# Patient Record
Sex: FEMALE | Race: WHITE | NOT HISPANIC OR LATINO | Employment: OTHER | URBAN - METROPOLITAN AREA
[De-identification: names, ages, dates, MRNs, and addresses within clinical notes are randomized per-mention and may not be internally consistent; named-entity substitution may affect disease eponyms.]

---

## 2018-11-07 PROBLEM — R60.0 LOWER EXTREMITY EDEMA: Status: ACTIVE | Noted: 2018-11-07

## 2018-11-07 PROBLEM — R32 URINARY INCONTINENCE: Status: ACTIVE | Noted: 2018-11-07

## 2018-11-07 PROBLEM — I10 HYPERTENSION: Status: ACTIVE | Noted: 2018-11-07

## 2018-11-07 PROBLEM — E78.5 HYPERLIPIDEMIA: Status: ACTIVE | Noted: 2018-11-07

## 2018-11-07 RX ORDER — LANOLIN ALCOHOL/MO/W.PET/CERES
1 CREAM (GRAM) TOPICAL DAILY
Refills: 0
Start: 2018-11-07 | End: 2018-12-17 | Stop reason: SDUPTHER

## 2018-11-07 RX ORDER — CITALOPRAM 20 MG/1
20 TABLET ORAL DAILY
Refills: 0
Start: 2018-11-07 | End: 2018-12-17 | Stop reason: SDUPTHER

## 2018-11-07 RX ORDER — FUROSEMIDE 40 MG/1
40 TABLET ORAL DAILY
Refills: 0
Start: 2018-11-07 | End: 2018-12-17 | Stop reason: SDUPTHER

## 2018-11-07 RX ORDER — AMLODIPINE BESYLATE 10 MG/1
10 TABLET ORAL DAILY
Refills: 0
Start: 2018-11-07 | End: 2018-12-17 | Stop reason: SDUPTHER

## 2018-11-07 RX ORDER — TRAZODONE HYDROCHLORIDE 50 MG/1
50 TABLET ORAL
Refills: 0
Start: 2018-11-07 | End: 2018-12-17 | Stop reason: SDUPTHER

## 2018-11-07 RX ORDER — MULTIVITAMIN
1 CAPSULE ORAL DAILY
Refills: 0
Start: 2018-11-07 | End: 2018-12-17 | Stop reason: SDUPTHER

## 2018-11-07 RX ORDER — ASPIRIN 81 MG/1
81 TABLET, CHEWABLE ORAL DAILY
Refills: 0
Start: 2018-11-07 | End: 2018-12-17 | Stop reason: SDUPTHER

## 2018-11-08 ENCOUNTER — OFFICE VISIT (OUTPATIENT)
Dept: FAMILY MEDICINE CLINIC | Facility: CLINIC | Age: 83
End: 2018-11-08
Payer: MEDICARE

## 2018-11-08 VITALS
BODY MASS INDEX: 29.37 KG/M2 | WEIGHT: 159.6 LBS | HEIGHT: 62 IN | SYSTOLIC BLOOD PRESSURE: 150 MMHG | RESPIRATION RATE: 16 BRPM | HEART RATE: 82 BPM | TEMPERATURE: 98.1 F | DIASTOLIC BLOOD PRESSURE: 80 MMHG

## 2018-11-08 DIAGNOSIS — I25.2 HISTORY OF MI (MYOCARDIAL INFARCTION): ICD-10-CM

## 2018-11-08 DIAGNOSIS — E78.5 HYPERLIPIDEMIA, UNSPECIFIED HYPERLIPIDEMIA TYPE: Chronic | ICD-10-CM

## 2018-11-08 DIAGNOSIS — Z76.0 MEDICATION REFILL: ICD-10-CM

## 2018-11-08 DIAGNOSIS — F33.1 MODERATE EPISODE OF RECURRENT MAJOR DEPRESSIVE DISORDER (HCC): Chronic | ICD-10-CM

## 2018-11-08 DIAGNOSIS — I10 ESSENTIAL HYPERTENSION: Chronic | ICD-10-CM

## 2018-11-08 DIAGNOSIS — R26.2 AMBULATORY DYSFUNCTION: Primary | ICD-10-CM

## 2018-11-08 DIAGNOSIS — Z95.820 S/P ANGIOPLASTY WITH STENT: ICD-10-CM

## 2018-11-08 PROBLEM — F41.9 ANXIETY: Status: ACTIVE | Noted: 2018-11-08

## 2018-11-08 PROBLEM — F32.A DEPRESSION: Status: ACTIVE | Noted: 2018-11-08

## 2018-11-08 PROBLEM — H35.30 MACULAR DEGENERATION: Status: ACTIVE | Noted: 2018-11-08

## 2018-11-08 PROBLEM — Q60.3 HYPOPLASIA OF ONE KIDNEY: Status: ACTIVE | Noted: 2018-11-08

## 2018-11-08 PROBLEM — H91.90 HEARING LOSS: Status: ACTIVE | Noted: 2018-11-08

## 2018-11-08 PROBLEM — I65.29 CAROTID STENOSIS: Status: ACTIVE | Noted: 2018-11-08

## 2018-11-08 PROBLEM — N39.0 CHRONIC UTI: Status: ACTIVE | Noted: 2018-11-08

## 2018-11-08 PROCEDURE — 99213 OFFICE O/P EST LOW 20 MIN: CPT | Performed by: FAMILY MEDICINE

## 2018-11-08 RX ORDER — IPRATROPIUM BROMIDE 42 UG/1
2 SPRAY, METERED NASAL 4 TIMES DAILY
COMMUNITY
End: 2018-12-27 | Stop reason: SDUPTHER

## 2018-11-08 RX ORDER — DIAPER,BRIEF,ADULT, DISPOSABLE
EACH MISCELLANEOUS
COMMUNITY

## 2018-11-08 RX ORDER — ALENDRONATE SODIUM 70 MG/1
70 TABLET ORAL
COMMUNITY
End: 2018-12-27 | Stop reason: SDUPTHER

## 2018-11-08 RX ORDER — CLOPIDOGREL BISULFATE 75 MG/1
75 TABLET ORAL DAILY
COMMUNITY
End: 2018-12-27 | Stop reason: SDUPTHER

## 2018-11-08 RX ORDER — ISOSORBIDE MONONITRATE 60 MG/1
60 TABLET, EXTENDED RELEASE ORAL DAILY
COMMUNITY

## 2018-11-08 RX ORDER — LOSARTAN POTASSIUM 50 MG/1
25 TABLET ORAL DAILY
COMMUNITY
End: 2018-12-27 | Stop reason: SDUPTHER

## 2018-11-08 RX ORDER — ATORVASTATIN CALCIUM 40 MG/1
40 TABLET, FILM COATED ORAL DAILY
COMMUNITY

## 2018-11-08 RX ORDER — CEPHALEXIN 250 MG/1
500 CAPSULE ORAL 4 TIMES DAILY
COMMUNITY
End: 2018-12-27 | Stop reason: SDUPTHER

## 2018-11-08 RX ORDER — MONTELUKAST SODIUM 10 MG/1
10 TABLET ORAL
COMMUNITY
End: 2018-12-13 | Stop reason: ALTCHOICE

## 2018-11-08 NOTE — PATIENT INSTRUCTIONS

## 2018-11-08 NOTE — Clinical Note
Patient just recently moved to the area and is older adult  Do you know of any senior care communities or resources for her to get involved in the community and make friends? Thank you!

## 2018-11-08 NOTE — PROGRESS NOTES
Thien Díaz 1934 female MRN: [id number]    Visit to Establish Care: Family Medicine    SUBJECTIVE    HPI:  Thien Díaz is a 80 y o  female who presented to establish care with this family medicine practice  Immunizations: Got flu this year, got shingles, TDAP months ago  Got 1 PNA vaccine  Recurrent falls  REcently 2 days ago in tub  Usually with legs giving out  Slips  Never prodromal symptoms  Want shower seat  Dizziness when stands too quickly  SOB with exertion (vacuming)  Not at rest  No chest pain  No urinary incontinence since sling  Keflex not covered by [employer], asking for formulary exception  Hasn't had a UTI over a year  She easily gets bruised on Plavix  No recent illness  Want to reduce medications  Wants cardiology referral  Recently had echo and carotid ultrasounds  Just moved from [place]  Living with son and daughter in law  Hx working in [place] [id number]  Fosamax is new  She just had bloodwork done by Dr Aleta Dance  PHQ-9 Depression Screening    PHQ-9:    Frequency of the following problems over the past two weeks:       Little interest or pleasure in doing things:  1 - several days  Feeling down, depressed, or hopeless:  0 - not at all  Trouble falling or staying asleep, or sleeping too much:  0 - not at all  Feeling tired or having little energy:  3 - nearly every day  Poor appetite or overeatin - not at all  Feeling bad about yourself - or that you are a failure or have let yourself or your family down:  0 - not at all  Trouble concentrating on things, such as reading the newspaper or watching television:  0 - not at all  Moving or speaking so slowly that other people could have noticed   Or the opposite - being so fidgety or restless that you have been moving around a lot more than usual:  0 - not at all  Thoughts that you would be better off dead, or of hurting yourself in some way:  0 - not at all  PHQ-2 Score:  1  PHQ-9 Score: 4       FLAVIO-7 Flowsheet Screening      Most Recent Value   Over the last two weeks, how often have you been bothered by the following problems? Feeling nervous, anxious, or on edge  3   Not being able to stop or control worrying  3   Worrying too much about different things  3   Trouble relaxing   0   Being so restless that it's hard to sit still  0   Becoming easily annoyed or irritable   0   Feeling afraid as if something awful might happen  0   FLAVIO Score   9          Health Maintenance   Topic Date Due    DTaP,Tdap,and Td Vaccines (1 - Tdap) 12/16/1955    Pneumococcal PPSV23/PCV13 65+ Years / Low and Medium Risk (1 of 2 - PCV13) 12/16/1999    INFLUENZA VACCINE  07/01/2018    Fall Risk  11/08/2019    Urinary Incontinence Screening  11/08/2019     CRC screening: No personal or family history of colon cancer or colon polyps  BrCa screening: There is no personal or family history of breast cancer  She denies finding new breast lumps, breast pain or nipple discharge  CVS screening: Patient denies any exertional chest pain, dyspnea, palpitations, syncope, orthopnea, edema or paroxysmal nocturnal dyspnea  DM screening: No polyuria, polydipsia, blurry vision, chest pain, dyspnea or claudication  No foot burning, numbness or pain  No personal or family history of skin cancers or melanoma  Review of Systems   Constitutional: Negative for activity change, chills and fever  HENT: Negative for congestion, rhinorrhea and sore throat  Eyes: Negative for visual disturbance  Respiratory: Negative for cough, shortness of breath and wheezing  Cardiovascular: Negative for chest pain and palpitations  Gastrointestinal: Negative for abdominal pain, blood in stool, constipation, diarrhea, nausea and vomiting  Genitourinary: Negative for dysuria  Musculoskeletal: Negative for arthralgias and myalgias  Skin: Negative for rash  Neurological: Positive for weakness (falls, usually mechanical)   Negative for dizziness, tremors, syncope, light-headedness and headaches  Psychiatric/Behavioral: Positive for dysphoric mood and sleep disturbance  Negative for self-injury and suicidal ideas  The patient is nervous/anxious  All other systems reviewed and are negative        Historical Information   Past Medical History:   Diagnosis Date    Hearing loss     History of MI (myocardial infarction) 11/9/2018    Hyperlipidemia     Hypertension     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    Myocardial infarction ([address] )     S/P angioplasty with stent 11/9/2018    S/p nephrectomy 11/9/2018    Urinary incontinence 11/7/2018     Past Surgical History:   Procedure Laterality Date    CARDIAC CATHETERIZATION      CAROTID STENT Left 2016    CATARACT EXTRACTION, BILATERAL  2010    CORONARY ANGIOPLASTY WITH STENT PLACEMENT  2006    INCONTINENCE SURGERY  2010    NEPHRECTOMY Left 2003    TONSILLECTOMY  1938    TUBAL LIGATION  1983     Family History   Problem Relation Age of Onset    Heart disease Mother     Diabetes type II Mother     Heart disease Father     Heart attack Father     Stroke Paternal Grandmother     Cancer Sister     Brain cancer Brother     Aneurysm Other      Social History   History   Alcohol Use No     History   Drug Use No     History   Smoking Status    Former Smoker    Packs/day: 1 00    Years: 40 00    Types: Cigarettes   Smokeless Tobacco    Never Used     Comment: quit 1991       Medications:      Current Outpatient Prescriptions:     alendronate (FOSAMAX) 70 mg tablet, Take 70 mg by mouth every 7 days, Disp: , Rfl:     amLODIPine (NORVASC) 10 mg tablet, Take 1 tablet (10 mg total) by mouth daily (Patient taking differently: Take 5 mg by mouth daily  ), Disp: , Rfl: 0    aspirin 81 mg chewable tablet, Chew 1 tablet (81 mg total) daily, Disp: , Rfl: 0    atorvastatin (LIPITOR) 40 mg tablet, Take 40 mg by mouth daily, Disp: , Rfl:     Calcium Carbonate-Vitamin D (CALTRATE 600+D) 600-400 MG-UNIT per tablet, Take 1 tablet by mouth daily, Disp: , Rfl: 0    cephalexin (KEFLEX) 250 mg capsule, Take 500 mg by mouth 4 (four) times a day, Disp: , Rfl:     citalopram (CeleXA) 20 mg tablet, Take 1 tablet (20 mg total) by mouth daily, Disp: , Rfl: 0    clopidogrel (PLAVIX) 75 mg tablet, Take 75 mg by mouth daily, Disp: , Rfl:     furosemide (LASIX) 40 mg tablet, Take 1 tablet (40 mg total) by mouth daily, Disp: , Rfl: 0    glucosamine-chondroitin 500-400 MG tablet, Take 1 tablet by mouth daily, Disp: , Rfl: 0    Incontinence Supply Disposable (DEPEND PANT LARGE) MISC, by Does not apply route, Disp: , Rfl:     ipratropium (ATROVENT) 0 06 % nasal spray, 2 sprays into each nostril 4 (four) times a day, Disp: , Rfl:     isosorbide mononitrate (IMDUR) 60 mg 24 hr tablet, Take 60 mg by mouth daily, Disp: , Rfl:     losartan (COZAAR) 50 mg tablet, Take 25 mg by mouth daily, Disp: , Rfl:     metoprolol tartrate (LOPRESSOR) 25 mg tablet, Take 1 tablet (25 mg total) by mouth every 12 (twelve) hours, Disp: , Rfl: 0    montelukast (SINGULAIR) 10 mg tablet, Take 10 mg by mouth daily at bedtime, Disp: , Rfl:     Multiple Vitamin (MULTIVITAMIN) capsule, Take 1 capsule by mouth daily, Disp: , Rfl: 0    traZODone (DESYREL) 50 mg tablet, Take 1 tablet (50 mg total) by mouth daily at bedtime, Disp: , Rfl: 0    Allergies   Allergen Reactions    Nitrofurantoin Headache    Penicillins     Sulfanilamide Hives       OBJECTIVE  Vitals:   Vitals:    11/08/18 1600   BP: 150/80   Pulse: 82   Resp: 16   Temp: 98 1 °F (36 7 °C)   Weight: 72 4 kg (159 lb 9 6 oz)   Height: 5' 1 5" (1 562 m)     Wt Readings from Last 3 Encounters:   11/08/18 72 4 kg (159 lb 9 6 oz)     Body mass index is 29 67 kg/m²  Temp Readings from Last 3 Encounters:   11/08/18 98 1 °F (36 7 °C)     BP Readings from Last 3 Encounters:   11/08/18 150/80     Pulse Readings from Last 3 Encounters:   11/08/18 82       No LMP recorded   Patient is postmenopausal     Physical Exam   Constitutional: She appears well-developed and well-nourished  HENT:   Head: Normocephalic and atraumatic  Eyes: Conjunctivae and EOM are normal    Cardiovascular: Normal rate, regular rhythm, normal heart sounds and intact distal pulses  No murmur heard  Pulmonary/Chest: Effort normal and breath sounds normal  No respiratory distress  She has no wheezes  Abdominal: Soft  Bowel sounds are normal  She exhibits no distension  There is no tenderness  Neurological: She is alert  Skin: Skin is warm and dry  Nursing note and vitals reviewed  Lab:  I have personally reviewed all pertinent results  No visits with results within 3 Month(s) from this visit  Latest known visit with results is:   Orders Only on 03/26/2015   Component Date Value Ref Range Status    Hemoglobin A1C 03/26/2015 6 1   Final       Orders Only on 03/26/2015   Component Date Value Ref Range Status    Hemoglobin A1C 03/26/2015 6 1   Final       Imaging:  I have personally reviewed all pertinent results      Assessment/Plan     Ambulatory dysfunction  Patient feels unsteady on feel with multiple falls  Referral to PT  Would review recent bloodwork from old PCP prior to ordering more blood work     Depression  PHQ9 score 4 today  Currently taking Celexa 20mg QD, continue    Anxiety  GAD7 score of 9 today  Continue with Celexa 20mg QD  Takes trazodone 50mg qHS PRN for sleep  Patient recently moved from [place], has family support  Would consider social work to help establish with senior community    Hyperlipidemia  Currently on Lipitor 40mg QD  Will obtain recently completed labs    Hypertension  At goal 150/80 today  Would recommend continue current regimen: amlodipine 5mg QD, Lasix 40mg QD, Imdur 60mg QD, losartan 25mg QD, metoprolol tartrate 25mg BID  Patient reports usually 120's SBP at home  To keep BP log daily in the morning 1 hour after medication for 2 weeks then send me log and we can adjust medication if needed    History of MI (myocardial infarction)  Patient continues on ASA and Plavix s/p stenting  Referral to cardiology to establish care    Lenny Ashraf was seen today for establish care  Diagnoses and all orders for this visit:    Ambulatory dysfunction  -     Shower chair  -     Ambulatory referral to Physical Therapy; Future    Medication refill  -     furosemide (LASIX) 40 mg tablet; Take 1 tablet (40 mg total) by mouth daily  -     amLODIPine (NORVASC) 10 mg tablet; Take 1 tablet (10 mg total) by mouth daily (Patient taking differently: Take 5 mg by mouth daily  )  -     traZODone (DESYREL) 50 mg tablet; Take 1 tablet (50 mg total) by mouth daily at bedtime  -     Multiple Vitamin (MULTIVITAMIN) capsule; Take 1 capsule by mouth daily  -     citalopram (CeleXA) 20 mg tablet; Take 1 tablet (20 mg total) by mouth daily  -     metoprolol tartrate (LOPRESSOR) 25 mg tablet; Take 1 tablet (25 mg total) by mouth every 12 (twelve) hours  -     glucosamine-chondroitin 500-400 MG tablet; Take 1 tablet by mouth daily  -     Calcium Carbonate-Vitamin D (CALTRATE 600+D) 600-400 MG-UNIT per tablet; Take 1 tablet by mouth daily  -     aspirin 81 mg chewable tablet; Chew 1 tablet (81 mg total) daily    History of MI (myocardial infarction)  -     Cancel: Ambulatory referral to Cardiology; Future  -     Ambulatory referral to Cardiology; Future    S/P angioplasty with stent  -     Cancel: Ambulatory referral to Cardiology; Future  -     Ambulatory referral to Cardiology;  Future    Moderate episode of recurrent major depressive disorder (HCC)    Essential hypertension    Hyperlipidemia, unspecified hyperlipidemia type        New Medications Ordered This Visit   Medications    furosemide (LASIX) 40 mg tablet     Sig: Take 1 tablet (40 mg total) by mouth daily     Refill:  0    amLODIPine (NORVASC) 10 mg tablet     Sig: Take 1 tablet (10 mg total) by mouth daily     Refill:  0    traZODone (DESYREL) 50 mg tablet     Sig: Take 1 tablet (50 mg total) by mouth daily at bedtime     Refill:  0    Multiple Vitamin (MULTIVITAMIN) capsule     Sig: Take 1 capsule by mouth daily     Refill:  0    citalopram (CeleXA) 20 mg tablet     Sig: Take 1 tablet (20 mg total) by mouth daily     Refill:  0    metoprolol tartrate (LOPRESSOR) 25 mg tablet     Sig: Take 1 tablet (25 mg total) by mouth every 12 (twelve) hours     Refill:  0    glucosamine-chondroitin 500-400 MG tablet     Sig: Take 1 tablet by mouth daily     Refill:  0    Calcium Carbonate-Vitamin D (CALTRATE 600+D) 600-400 MG-UNIT per tablet     Sig: Take 1 tablet by mouth daily     Refill:  0    aspirin 81 mg chewable tablet     Sig: Chew 1 tablet (81 mg total) daily     Refill:  0    montelukast (SINGULAIR) 10 mg tablet     Sig: Take 10 mg by mouth daily at bedtime    clopidogrel (PLAVIX) 75 mg tablet     Sig: Take 75 mg by mouth daily    alendronate (FOSAMAX) 70 mg tablet     Sig: Take 70 mg by mouth every 7 days    isosorbide mononitrate (IMDUR) 60 mg 24 hr tablet     Sig: Take 60 mg by mouth daily    Incontinence Supply Disposable (DEPEND PANT LARGE) MISC     Sig: by Does not apply route    atorvastatin (LIPITOR) 40 mg tablet     Sig: Take 40 mg by mouth daily    cephalexin (KEFLEX) 250 mg capsule     Sig: Take 500 mg by mouth 4 (four) times a day    losartan (COZAAR) 50 mg tablet     Sig: Take 25 mg by mouth daily    ipratropium (ATROVENT) 0 06 % nasal spray     Si sprays into each nostril 4 (four) times a day       In addition to the above, the patient was counseled on general preventative health care subjects, including but not limited to:  - Nutrition, healthy weight, aerobic and weight-bearing exercise  - Mental health, social support, and self care  - Advised of the importance of dental hygiene and routine dental visits   - Patient made aware of  services at the office        There is no immunization history on file for this patient  Immunization status: stated as current, but no records available  Return to [facility] in 1 year for annual  visit  PCP: Chet Sandhu MD    Future Appointments  Date Time Provider Jean Welch   2/26/2019 4:40 PM Chet Sandhu MD [facility]       ____________________________________________________________________   The attending physician, Dr Lelia Bravo, agreed with the plan  Chet Sandhu MD, PGY-3  [facility]   11/9/2018     Please be aware that this note contains text that was dictated and there may be errors pertaining to "sound-alike "words during the dictation process

## 2018-11-09 PROBLEM — Z90.5 S/P NEPHRECTOMY: Status: ACTIVE | Noted: 2018-11-09

## 2018-11-09 PROBLEM — R32 URINARY INCONTINENCE: Status: RESOLVED | Noted: 2018-11-07 | Resolved: 2018-11-09

## 2018-11-09 PROBLEM — E78.5 HYPERLIPIDEMIA: Chronic | Status: ACTIVE | Noted: 2018-11-07

## 2018-11-09 PROBLEM — H91.90 HEARING LOSS: Chronic | Status: ACTIVE | Noted: 2018-11-08

## 2018-11-09 PROBLEM — I65.29 CAROTID STENOSIS: Chronic | Status: ACTIVE | Noted: 2018-11-08

## 2018-11-09 PROBLEM — Z95.820 S/P ANGIOPLASTY WITH STENT: Status: ACTIVE | Noted: 2018-11-09

## 2018-11-09 PROBLEM — Z90.5 S/P NEPHRECTOMY: Chronic | Status: ACTIVE | Noted: 2018-11-09

## 2018-11-09 PROBLEM — F32.A DEPRESSION: Chronic | Status: ACTIVE | Noted: 2018-11-08

## 2018-11-09 PROBLEM — I25.2 HISTORY OF MI (MYOCARDIAL INFARCTION): Status: ACTIVE | Noted: 2018-11-09

## 2018-11-09 PROBLEM — R26.2 AMBULATORY DYSFUNCTION: Status: ACTIVE | Noted: 2018-11-09

## 2018-11-09 PROBLEM — F41.9 ANXIETY: Chronic | Status: ACTIVE | Noted: 2018-11-08

## 2018-11-09 PROBLEM — I10 HYPERTENSION: Chronic | Status: ACTIVE | Noted: 2018-11-07

## 2018-11-09 NOTE — ASSESSMENT & PLAN NOTE
At goal 150/80 today  Would recommend continue current regimen: amlodipine 5mg QD, Lasix 40mg QD, Imdur 60mg QD, losartan 25mg QD, metoprolol tartrate 25mg BID  Patient reports usually 120's SBP at home  To keep BP log daily in the morning 1 hour after medication for 2 weeks then send me log and we can adjust medication if needed

## 2018-11-09 NOTE — ASSESSMENT & PLAN NOTE
Patient feels unsteady on feel with multiple falls  Referral to PT  Would review recent bloodwork from old PCP prior to ordering more blood work

## 2018-11-09 NOTE — ASSESSMENT & PLAN NOTE
GAD7 score of 9 today  Continue with Celexa 20mg QD  Takes trazodone 50mg qHS PRN for sleep  Patient recently moved from [place], has family support  Would consider social work to help establish with senior community

## 2018-11-13 ENCOUNTER — PATIENT OUTREACH (OUTPATIENT)
Dept: FAMILY MEDICINE CLINIC | Facility: CLINIC | Age: 83
End: 2018-11-13

## 2018-11-13 DIAGNOSIS — Z78.9 NEED FOR FOLLOW-UP BY SOCIAL WORKER: Primary | ICD-10-CM

## 2018-11-13 NOTE — PROGRESS NOTES
Received request to contact patient to provide information regarding senior care community resources  Call to patient regarding same and to provide information however patient reports that she has obtained information and does not require further assistance  She will contact  if needed for future assistance  Will continue to be available to provide support

## 2018-11-15 DIAGNOSIS — N18.4 CKD (CHRONIC KIDNEY DISEASE) STAGE 4, GFR 15-29 ML/MIN (HCC): Primary | ICD-10-CM

## 2018-11-29 ENCOUNTER — TELEPHONE (OUTPATIENT)
Dept: FAMILY MEDICINE CLINIC | Facility: CLINIC | Age: 83
End: 2018-11-29

## 2018-11-29 NOTE — TELEPHONE ENCOUNTER
Received list of BP from patient, per our instructions from last visit  BP range over the last 2 weeks: 110-153/62-74 with average 130's/60's  Would recommend that she continue her current medication regimen without changes  Will scan in log from patient  Please call patient - thank her for blood pressure logs and to continue all meds that she's been taking

## 2018-11-29 NOTE — TELEPHONE ENCOUNTER
Just an FYI, Patient dropped off a list of her blood pressure's it will be in your bin  In a small white envelope

## 2018-12-13 ENCOUNTER — OFFICE VISIT (OUTPATIENT)
Dept: CARDIOLOGY CLINIC | Facility: CLINIC | Age: 83
End: 2018-12-13
Payer: MEDICARE

## 2018-12-13 VITALS
BODY MASS INDEX: 28.26 KG/M2 | WEIGHT: 152 LBS | DIASTOLIC BLOOD PRESSURE: 90 MMHG | SYSTOLIC BLOOD PRESSURE: 170 MMHG | OXYGEN SATURATION: 95 % | HEART RATE: 64 BPM

## 2018-12-13 DIAGNOSIS — I25.2 HISTORY OF MI (MYOCARDIAL INFARCTION): ICD-10-CM

## 2018-12-13 DIAGNOSIS — Z95.820 S/P ANGIOPLASTY WITH STENT: ICD-10-CM

## 2018-12-13 DIAGNOSIS — I65.23 BILATERAL CAROTID ARTERY STENOSIS: Chronic | ICD-10-CM

## 2018-12-13 DIAGNOSIS — I10 ESSENTIAL HYPERTENSION: Primary | Chronic | ICD-10-CM

## 2018-12-13 DIAGNOSIS — E78.5 HYPERLIPIDEMIA, UNSPECIFIED HYPERLIPIDEMIA TYPE: Chronic | ICD-10-CM

## 2018-12-13 PROCEDURE — 99203 OFFICE O/P NEW LOW 30 MIN: CPT | Performed by: INTERNAL MEDICINE

## 2018-12-13 PROCEDURE — 93000 ELECTROCARDIOGRAM COMPLETE: CPT | Performed by: INTERNAL MEDICINE

## 2018-12-13 NOTE — PROGRESS NOTES
Cardiology Follow Up    Nelson Arango  1934  [address] [facility] [address] [facility] [address] [place] [address] Road    1  Essential hypertension  POCT ECG   2  S/P angioplasty with stent  Ambulatory referral to Cardiology    POCT ECG   3  Hyperlipidemia, unspecified hyperlipidemia type  POCT ECG   4  Bilateral carotid artery stenosis  POCT ECG   5  History of MI (myocardial infarction)  Ambulatory referral to Cardiology    POCT ECG       Interval History:   Cardiology consultation  Most pleasant 80year old female had history of coronary disease  She suffered myocardial infarction back in 2006, received a stent at that time possibly RCA although I do not have a copy of the report, no recent testing cardiac wise  She did have a stress test years ago and she felt extremely sick from that and she is somewhat reluctant to do more testing  She is currently asymptomatic from the cardiac point of view, denies any chest pain or dyspnea, she is active at home  No regular exercise  She has been compliant low-cholesterol diet, I do not have a recent fractionation  LDL last year was 70 with an HDl 39  On high-intensity statin therapy  Multiple records reviewed  She did undergo carotid endarterectomy on the right few years ago, she did receive a stent on the right as well  She did have a duplex this year that revealed patent stent and tortuous left-sided with increased velocities possibly due to tortuosity  Unchanged from 2 years prior  She does have chronic kidney disease status post left nephrectomy, most recent creatinine 1 8, GFR in the 30s  She does have ambulatory dysfunction, history of falls and orthostatic hypotension    She has been on dual antiplatelet therapy for all this years    Patient Active Problem List   Diagnosis    Hypertension    Hyperlipidemia    Lower extremity edema    Hearing loss  Macular degeneration    Hypoplasia of one kidney    Chronic UTI    Carotid stenosis    Depression    Anxiety    S/p nephrectomy    History of MI (myocardial infarction)    S/P angioplasty with stent    Ambulatory dysfunction     Past Medical History:   Diagnosis Date    Hearing loss     History of MI (myocardial infarction) 11/9/2018    Hyperlipidemia     Hypertension     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    Myocardial infarction ([address] )     S/P angioplasty with stent 11/9/2018    S/p nephrectomy 11/9/2018    Urinary incontinence 11/7/2018     Social History     Social History    Marital status:      Spouse name: N/A    Number of children: N/A    Years of education: N/A     Occupational History    Not on file       Social History Main Topics    Smoking status: Former Smoker     Packs/day: 1 00     Years: 40 00     Types: Cigarettes    Smokeless tobacco: Never Used      Comment: quit 1991    Alcohol use No    Drug use: No    Sexual activity: Not on file     Other Topics Concern    Not on file     Social History Narrative    Seatbelt in car          Retired       Family History   Problem Relation Age of Onset    Heart disease Mother     Diabetes type II Mother     Heart disease Father     Heart attack Father     Stroke Paternal Grandmother     Cancer Sister     Brain cancer Brother     Aneurysm Other      Past Surgical History:   Procedure Laterality Date    CARDIAC CATHETERIZATION      CAROTID STENT Left 2016    CATARACT EXTRACTION, BILATERAL  2010    CORONARY ANGIOPLASTY WITH STENT PLACEMENT  2006    INCONTINENCE SURGERY  2010    NEPHRECTOMY Left 2003    TONSILLECTOMY  1938    TUBAL LIGATION  1983       Current Outpatient Prescriptions:     alendronate (FOSAMAX) 70 mg tablet, Take 70 mg by mouth every 7 days, Disp: , Rfl:     amLODIPine (NORVASC) 10 mg tablet, Take 1 tablet (10 mg total) by mouth daily (Patient taking differently: Take 5 mg by mouth daily ), Disp: , Rfl: 0    aspirin 81 mg chewable tablet, Chew 1 tablet (81 mg total) daily, Disp: , Rfl: 0    atorvastatin (LIPITOR) 40 mg tablet, Take 40 mg by mouth daily, Disp: , Rfl:     Calcium Carbonate-Vitamin D (CALTRATE 600+D) 600-400 MG-UNIT per tablet, Take 1 tablet by mouth daily, Disp: , Rfl: 0    citalopram (CeleXA) 20 mg tablet, Take 1 tablet (20 mg total) by mouth daily, Disp: , Rfl: 0    clopidogrel (PLAVIX) 75 mg tablet, Take 75 mg by mouth daily, Disp: , Rfl:     furosemide (LASIX) 40 mg tablet, Take 1 tablet (40 mg total) by mouth daily, Disp: , Rfl: 0    glucosamine-chondroitin 500-400 MG tablet, Take 1 tablet by mouth daily, Disp: , Rfl: 0    Incontinence Supply Disposable (DEPEND PANT LARGE) MISC, by Does not apply route, Disp: , Rfl:     ipratropium (ATROVENT) 0 06 % nasal spray, 2 sprays into each nostril 4 (four) times a day, Disp: , Rfl:     isosorbide mononitrate (IMDUR) 60 mg 24 hr tablet, Take 60 mg by mouth daily, Disp: , Rfl:     losartan (COZAAR) 50 mg tablet, Take 25 mg by mouth daily, Disp: , Rfl:     metoprolol tartrate (LOPRESSOR) 25 mg tablet, Take 1 tablet (25 mg total) by mouth every 12 (twelve) hours, Disp: , Rfl: 0    Multiple Vitamin (MULTIVITAMIN) capsule, Take 1 capsule by mouth daily, Disp: , Rfl: 0    traZODone (DESYREL) 50 mg tablet, Take 1 tablet (50 mg total) by mouth daily at bedtime, Disp: , Rfl: 0    cephalexin (KEFLEX) 250 mg capsule, Take 500 mg by mouth 4 (four) times a day, Disp: , Rfl:   Allergies   Allergen Reactions    Nitrofurantoin Headache    Penicillins     Sulfanilamide Hives       Labs:  No visits with results within 6 Month(s) from this visit  Latest known visit with results is:   Orders Only on 03/26/2015   Component Date Value    Hemoglobin A1C 03/26/2015 6 1      Imaging: No results found  Review of Systems:  Review of Systems   Constitutional: Negative for activity change, fatigue and unexpected weight change     HENT: Positive for hearing loss  Negative for nosebleeds  Eyes: Negative for visual disturbance  Respiratory: Negative for apnea, shortness of breath, wheezing and stridor  Cardiovascular: Negative for chest pain, palpitations and leg swelling  Gastrointestinal: Negative for abdominal pain and blood in stool  Endocrine: Negative for cold intolerance  Genitourinary: Positive for difficulty urinating and urgency  Negative for frequency and hematuria  Musculoskeletal: Positive for arthralgias and gait problem  Negative for myalgias  Skin: Negative for pallor  Allergic/Immunologic: Negative for immunocompromised state  Neurological: Positive for light-headedness  Negative for dizziness, syncope and weakness  Hematological: Bruises/bleeds easily  Psychiatric/Behavioral: Negative for confusion and sleep disturbance  Physical Exam:  Physical Exam   Constitutional: She is oriented to person, place, and time  She appears well-developed  No distress  HENT:   Head: Normocephalic  Eyes: No scleral icterus (Subconjunctival hemorrhage on the left )  Neck: No JVD present  No tracheal deviation present  No thyromegaly present  Cardiovascular: Normal rate, regular rhythm, normal heart sounds and intact distal pulses  Exam reveals no gallop and no friction rub  No murmur heard  Left carotid bruit   Pulmonary/Chest: Effort normal and breath sounds normal  No stridor  No respiratory distress  She has no wheezes  She has no rales  She exhibits no tenderness  Abdominal: Soft  Bowel sounds are normal    Neurological: She is alert and oriented to person, place, and time  Skin: Skin is warm and dry  She is not diaphoretic  Psychiatric: She has a normal mood and affect  Discussion/Summary:  Coronary disease, currently asymptomatic, my preference will proceed with a pharmacological stress test, she is somewhat reluctant to do so she will think about it  Continue current medications    Continue dual antiplatelet therapy and beta-blocker is also nitrates, currently having no angina

## 2018-12-14 ENCOUNTER — TELEPHONE (OUTPATIENT)
Dept: FAMILY MEDICINE CLINIC | Facility: CLINIC | Age: 83
End: 2018-12-14

## 2018-12-14 DIAGNOSIS — J30.89 SEASONAL ALLERGIC RHINITIS DUE TO OTHER ALLERGIC TRIGGER: ICD-10-CM

## 2018-12-14 DIAGNOSIS — M19.91 PRIMARY OSTEOARTHRITIS, UNSPECIFIED SITE: ICD-10-CM

## 2018-12-14 DIAGNOSIS — Z95.820 S/P ANGIOPLASTY WITH STENT: ICD-10-CM

## 2018-12-14 DIAGNOSIS — N39.0 CHRONIC UTI: ICD-10-CM

## 2018-12-14 DIAGNOSIS — Z76.0 MEDICATION REFILL: ICD-10-CM

## 2018-12-14 DIAGNOSIS — I10 ESSENTIAL HYPERTENSION: Primary | Chronic | ICD-10-CM

## 2018-12-14 NOTE — TELEPHONE ENCOUNTER
Pt is calling to request refills on the following medications, she was seen by Dr Isac Lea on 11/07, but forgot to mention her medicationss were not going to last her until next appointment      Norvasc 10mg  Aspirin 81mg  Caltrate 600+ D  Celexa 20mg  Lasix 40mg  Metoprolol 25mg  Multivitamin   Trazodone 50mg  glucosamine-chondroitin 500-400 MG tablet

## 2018-12-14 NOTE — TELEPHONE ENCOUNTER
Patient walked in today and would us to send her medication to the [facility] pharmacy and also are we able to do a 90 supply on the following medication ? Thanks

## 2018-12-15 ENCOUNTER — APPOINTMENT (OUTPATIENT)
Dept: LAB | Facility: HOSPITAL | Age: 83
End: 2018-12-15
Attending: INTERNAL MEDICINE
Payer: MEDICARE

## 2018-12-15 DIAGNOSIS — Z95.820 S/P ANGIOPLASTY WITH STENT: ICD-10-CM

## 2018-12-15 DIAGNOSIS — I25.2 HISTORY OF MI (MYOCARDIAL INFARCTION): ICD-10-CM

## 2018-12-15 DIAGNOSIS — E78.5 HYPERLIPIDEMIA, UNSPECIFIED HYPERLIPIDEMIA TYPE: Chronic | ICD-10-CM

## 2018-12-15 DIAGNOSIS — I65.23 BILATERAL CAROTID ARTERY STENOSIS: Chronic | ICD-10-CM

## 2018-12-15 LAB
CHOLEST SERPL-MCNC: 121 MG/DL (ref 50–200)
HDLC SERPL-MCNC: 43 MG/DL (ref 40–60)
LDLC SERPL CALC-MCNC: 66 MG/DL (ref 0–100)
TRIGL SERPL-MCNC: 58 MG/DL

## 2018-12-15 PROCEDURE — 36415 COLL VENOUS BLD VENIPUNCTURE: CPT

## 2018-12-15 PROCEDURE — 80061 LIPID PANEL: CPT

## 2018-12-17 RX ORDER — MULTIVITAMIN
1 CAPSULE ORAL DAILY
Qty: 90 CAPSULE | Refills: 3 | Status: SHIPPED | OUTPATIENT
Start: 2018-12-17

## 2018-12-17 RX ORDER — ASPIRIN 81 MG/1
81 TABLET, CHEWABLE ORAL DAILY
Qty: 90 TABLET | Refills: 3 | Status: SHIPPED | OUTPATIENT
Start: 2018-12-17

## 2018-12-17 RX ORDER — LANOLIN ALCOHOL/MO/W.PET/CERES
1 CREAM (GRAM) TOPICAL DAILY
Qty: 90 TABLET | Refills: 3 | Status: SHIPPED | OUTPATIENT
Start: 2018-12-17

## 2018-12-17 RX ORDER — CITALOPRAM 20 MG/1
20 TABLET ORAL DAILY
Qty: 90 TABLET | Refills: 3 | Status: SHIPPED | OUTPATIENT
Start: 2018-12-17

## 2018-12-17 RX ORDER — FUROSEMIDE 40 MG/1
40 TABLET ORAL DAILY
Qty: 90 TABLET | Refills: 3 | Status: SHIPPED | OUTPATIENT
Start: 2018-12-17

## 2018-12-17 RX ORDER — TRAZODONE HYDROCHLORIDE 50 MG/1
50 TABLET ORAL
Qty: 90 TABLET | Refills: 3 | Status: SHIPPED | OUTPATIENT
Start: 2018-12-17

## 2018-12-17 RX ORDER — AMLODIPINE BESYLATE 5 MG/1
5 TABLET ORAL DAILY
Qty: 90 TABLET | Refills: 3 | Status: SHIPPED | OUTPATIENT
Start: 2018-12-17

## 2018-12-26 NOTE — TELEPHONE ENCOUNTER
Pt's son walked in this morning, says not sure what happened but pt actually needs rest of meds before her appt on 2/26 with Dr Joseph Mabry also   Gave a list of clopidogrel, Losartan, alendronate, isosorbide, atorvastatin, cephalexin, ipratropium nasal spray

## 2018-12-27 PROBLEM — Z98.890 STATUS POST CREATION OF URETHRAL SLING BY SUPRAPUBIC APPROACH: Status: ACTIVE | Noted: 2018-12-27

## 2018-12-27 RX ORDER — CLOPIDOGREL BISULFATE 75 MG/1
75 TABLET ORAL DAILY
Qty: 90 TABLET | Refills: 3 | Status: SHIPPED | OUTPATIENT
Start: 2018-12-27

## 2018-12-27 RX ORDER — LOSARTAN POTASSIUM 50 MG/1
25 TABLET ORAL DAILY
Qty: 45 TABLET | Refills: 3 | Status: SHIPPED | OUTPATIENT
Start: 2018-12-27

## 2018-12-27 RX ORDER — ALENDRONATE SODIUM 70 MG/1
70 TABLET ORAL
Qty: 51 TABLET | Refills: 0 | Status: SHIPPED | OUTPATIENT
Start: 2018-12-27 | End: 2019-12-27

## 2018-12-27 RX ORDER — IPRATROPIUM BROMIDE 42 UG/1
2 SPRAY, METERED NASAL 4 TIMES DAILY
Qty: 15 ML | Refills: 6 | Status: SHIPPED | OUTPATIENT
Start: 2018-12-27

## 2018-12-27 RX ORDER — CEPHALEXIN 500 MG/1
500 CAPSULE ORAL EVERY 12 HOURS SCHEDULED
Qty: 14 CAPSULE | Refills: 0 | Status: SHIPPED | OUTPATIENT
Start: 2018-12-27 | End: 2019-01-03